# Patient Record
Sex: FEMALE | Race: WHITE | NOT HISPANIC OR LATINO | URBAN - NONMETROPOLITAN AREA
[De-identification: names, ages, dates, MRNs, and addresses within clinical notes are randomized per-mention and may not be internally consistent; named-entity substitution may affect disease eponyms.]

---

## 2017-05-15 ENCOUNTER — FOLLOW UP ESTABLISHED (OUTPATIENT)
Dept: URBAN - NONMETROPOLITAN AREA CLINIC 3 | Facility: CLINIC | Age: 72
End: 2017-05-15
Payer: MEDICARE

## 2017-05-15 DIAGNOSIS — H31.091 CHORIORETINAL SCARS, RIGHT EYE: Primary | ICD-10-CM

## 2017-05-15 PROCEDURE — 92134 CPTRZ OPH DX IMG PST SGM RTA: CPT | Performed by: OPTOMETRIST

## 2017-05-15 PROCEDURE — 92012 INTRM OPH EXAM EST PATIENT: CPT | Performed by: OPTOMETRIST

## 2017-05-15 ASSESSMENT — INTRAOCULAR PRESSURE
OD: 12
OS: 13

## 2017-05-16 ENCOUNTER — FOLLOW UP ESTABLISHED (OUTPATIENT)
Dept: URBAN - METROPOLITAN AREA CLINIC 10 | Facility: CLINIC | Age: 72
End: 2017-05-16
Payer: MEDICARE

## 2017-05-16 DIAGNOSIS — H33.311 HORSESHOE TEAR OF RETINA WITHOUT DETACHMENT, RIGHT EYE: ICD-10-CM

## 2017-05-16 PROCEDURE — 92134 CPTRZ OPH DX IMG PST SGM RTA: CPT | Performed by: OPHTHALMOLOGY

## 2017-05-16 PROCEDURE — 92014 COMPRE OPH EXAM EST PT 1/>: CPT | Performed by: OPHTHALMOLOGY

## 2017-05-16 ASSESSMENT — INTRAOCULAR PRESSURE
OD: 14
OS: 15

## 2017-11-06 ENCOUNTER — FOLLOW UP ESTABLISHED (OUTPATIENT)
Dept: URBAN - NONMETROPOLITAN AREA CLINIC 3 | Facility: CLINIC | Age: 72
End: 2017-11-06
Payer: MEDICARE

## 2017-11-06 DIAGNOSIS — H02.831 DERMATOCHALASIS OF RIGHT UPPER LID: ICD-10-CM

## 2017-11-06 DIAGNOSIS — H02.834 DERMATOCHALASIS OF LEFT UPPER LID: ICD-10-CM

## 2017-11-06 PROCEDURE — 92134 CPTRZ OPH DX IMG PST SGM RTA: CPT | Performed by: OPTOMETRIST

## 2017-11-06 PROCEDURE — 92014 COMPRE OPH EXAM EST PT 1/>: CPT | Performed by: OPTOMETRIST

## 2017-11-06 ASSESSMENT — KERATOMETRY
OS: 44.75
OD: 44.13

## 2017-11-06 ASSESSMENT — INTRAOCULAR PRESSURE
OD: 16
OS: 14

## 2017-11-06 ASSESSMENT — VISUAL ACUITY
OS: 20/30
OD: 20/30

## 2018-01-11 ENCOUNTER — FOLLOW UP ESTABLISHED (OUTPATIENT)
Dept: URBAN - METROPOLITAN AREA CLINIC 24 | Facility: CLINIC | Age: 73
End: 2018-01-11
Payer: MEDICARE

## 2018-01-11 DIAGNOSIS — Z41.1 ENCOUNTER FOR COSMETIC SURGERY: ICD-10-CM

## 2018-01-11 PROCEDURE — 99214 OFFICE O/P EST MOD 30 MIN: CPT | Performed by: OPHTHALMOLOGY

## 2018-01-11 PROCEDURE — 92081 LIMITED VISUAL FIELD XM: CPT | Performed by: OPHTHALMOLOGY

## 2018-01-11 PROCEDURE — 92285 EXTERNAL OCULAR PHOTOGRAPHY: CPT | Performed by: OPHTHALMOLOGY

## 2018-01-11 RX ORDER — NEOMYCIN SULFATE, POLYMYXIN B SULFATE AND DEXAMETHASONE 3.5; 10000; 1 MG/G; [USP'U]/G; MG/G
OINTMENT OPHTHALMIC
Qty: 1 | Refills: 1 | Status: INACTIVE
Start: 2018-01-11 | End: 2021-02-19

## 2018-01-11 ASSESSMENT — INTRAOCULAR PRESSURE
OS: 11
OD: 11

## 2018-02-27 ENCOUNTER — Encounter (OUTPATIENT)
Dept: URBAN - METROPOLITAN AREA CLINIC 24 | Facility: CLINIC | Age: 73
End: 2018-02-27
Payer: MEDICARE

## 2018-02-27 DIAGNOSIS — H02.31 BLEPHAROCHALASIS RIGHT UPPER EYELID: ICD-10-CM

## 2018-02-27 DIAGNOSIS — H02.34 BLEPHAROCHALASIS LEFT UPPER EYELID: ICD-10-CM

## 2018-02-27 DIAGNOSIS — Z01.818 ENCOUNTER FOR OTHER PREPROCEDURAL EXAMINATION: Primary | ICD-10-CM

## 2018-02-27 PROCEDURE — 99213 OFFICE O/P EST LOW 20 MIN: CPT | Performed by: PHYSICIAN ASSISTANT

## 2018-03-15 ENCOUNTER — SURGERY (OUTPATIENT)
Dept: URBAN - METROPOLITAN AREA SURGERY 12 | Facility: SURGERY | Age: 73
End: 2018-03-15
Payer: MEDICARE

## 2018-03-29 ENCOUNTER — POST OP (OUTPATIENT)
Dept: URBAN - METROPOLITAN AREA CLINIC 24 | Facility: CLINIC | Age: 73
End: 2018-03-29
Payer: MEDICARE

## 2018-03-29 PROCEDURE — 99024 POSTOP FOLLOW-UP VISIT: CPT | Performed by: OPHTHALMOLOGY

## 2018-05-03 ENCOUNTER — POST OP (OUTPATIENT)
Dept: URBAN - METROPOLITAN AREA CLINIC 24 | Facility: CLINIC | Age: 73
End: 2018-05-03

## 2018-05-03 PROCEDURE — 99024 POSTOP FOLLOW-UP VISIT: CPT | Performed by: OPHTHALMOLOGY

## 2021-02-19 ENCOUNTER — NEW PATIENT (OUTPATIENT)
Dept: URBAN - NONMETROPOLITAN AREA CLINIC 3 | Facility: CLINIC | Age: 76
End: 2021-02-19
Payer: MEDICARE

## 2021-02-19 DIAGNOSIS — H11.041 PERIPHERAL PTERYGIUM, STATIONARY, RIGHT EYE: ICD-10-CM

## 2021-02-19 DIAGNOSIS — H16.141 PUNCTATE KERATITIS OF RIGHT EYE: Primary | ICD-10-CM

## 2021-02-19 PROCEDURE — 99203 OFFICE O/P NEW LOW 30 MIN: CPT | Performed by: OPTOMETRIST

## 2021-02-19 ASSESSMENT — INTRAOCULAR PRESSURE
OD: 15
OS: 14

## 2021-02-26 ENCOUNTER — FOLLOW UP ESTABLISHED (OUTPATIENT)
Dept: URBAN - NONMETROPOLITAN AREA CLINIC 3 | Facility: CLINIC | Age: 76
End: 2021-02-26
Payer: MEDICARE

## 2021-02-26 DIAGNOSIS — H16.143 PUNCTATE KERATITIS, BILATERAL: Primary | ICD-10-CM

## 2021-02-26 PROCEDURE — 99213 OFFICE O/P EST LOW 20 MIN: CPT | Performed by: OPTOMETRIST

## 2021-02-26 PROCEDURE — 92134 CPTRZ OPH DX IMG PST SGM RTA: CPT | Performed by: OPTOMETRIST

## 2021-02-26 ASSESSMENT — KERATOMETRY
OD: 44.25
OS: 44.75

## 2021-02-26 ASSESSMENT — VISUAL ACUITY
OD: 20/30
OS: 20/30

## 2021-02-26 ASSESSMENT — INTRAOCULAR PRESSURE
OD: 16
OS: 15

## 2021-05-27 ENCOUNTER — OFFICE VISIT (OUTPATIENT)
Dept: URBAN - NONMETROPOLITAN AREA CLINIC 3 | Facility: CLINIC | Age: 76
End: 2021-05-27
Payer: MEDICARE

## 2021-05-27 DIAGNOSIS — H53.122 TRANSIENT VISUAL LOSS, LEFT EYE: Primary | ICD-10-CM

## 2021-05-27 PROCEDURE — 99213 OFFICE O/P EST LOW 20 MIN: CPT | Performed by: OPTOMETRIST

## 2021-05-27 ASSESSMENT — INTRAOCULAR PRESSURE
OD: 10
OS: 11

## 2021-05-27 NOTE — IMPRESSION/PLAN
Impression: Transient visual loss, left eye: H53.122. Plan: Discussed diagnosis with pt. Pt reported transient monocular visual obscuration that lasted ~4-5min. Recommended full cardiac workup with cardiologist including carotid ultrasound. Discussed risk of TIA. Pt also describes intermittent diplopia with temporal headache. Rule out Giant cell arteritis. Recommend CBC, ESR and CRP.

## 2022-09-26 ENCOUNTER — OFFICE VISIT (OUTPATIENT)
Dept: URBAN - NONMETROPOLITAN AREA CLINIC 3 | Facility: CLINIC | Age: 77
End: 2022-09-26
Payer: MEDICARE

## 2022-09-26 DIAGNOSIS — H53.2 DIPLOPIA: Primary | ICD-10-CM

## 2022-09-26 DIAGNOSIS — H16.143 PUNCTATE KERATITIS, BILATERAL: ICD-10-CM

## 2022-09-26 PROCEDURE — 99212 OFFICE O/P EST SF 10 MIN: CPT | Performed by: OPTOMETRIST

## 2022-09-26 ASSESSMENT — INTRAOCULAR PRESSURE
OS: 14
OD: 14

## 2022-09-26 ASSESSMENT — VISUAL ACUITY
OD: 20/40
OS: 20/30

## 2022-09-26 NOTE — IMPRESSION/PLAN
Impression: Punctate keratitis, bilateral Plan: Discussed the diagnosis with patient. Advised use of AFT's QID/QHS/OU. Patient states understanding.

## 2022-09-26 NOTE — IMPRESSION/PLAN
Impression: Diplopia: H53.2. Plan: not  demonstrated on examination today,  normal  EOM. discussed  as tig  blur.